# Patient Record
Sex: FEMALE | Race: WHITE | Employment: FULL TIME | URBAN - METROPOLITAN AREA
[De-identification: names, ages, dates, MRNs, and addresses within clinical notes are randomized per-mention and may not be internally consistent; named-entity substitution may affect disease eponyms.]

---

## 2020-02-08 ENCOUNTER — HOSPITAL ENCOUNTER (EMERGENCY)
Facility: CLINIC | Age: 65
Discharge: HOME | End: 2020-02-08
Attending: EMERGENCY MEDICINE | Admitting: EMERGENCY MEDICINE
Payer: COMMERCIAL

## 2020-02-08 ENCOUNTER — APPOINTMENT (OUTPATIENT)
Dept: GENERAL RADIOLOGY | Facility: CLINIC | Age: 65
End: 2020-02-08
Attending: EMERGENCY MEDICINE
Payer: COMMERCIAL

## 2020-02-08 VITALS
TEMPERATURE: 97.9 F | HEART RATE: 89 BPM | BODY MASS INDEX: 45.98 KG/M2 | OXYGEN SATURATION: 98 % | HEIGHT: 65 IN | RESPIRATION RATE: 18 BRPM | DIASTOLIC BLOOD PRESSURE: 72 MMHG | WEIGHT: 276 LBS | SYSTOLIC BLOOD PRESSURE: 146 MMHG

## 2020-02-08 DIAGNOSIS — R07.9 CHEST PAIN, UNSPECIFIED TYPE: ICD-10-CM

## 2020-02-08 DIAGNOSIS — R10.13 EPIGASTRIC PAIN: ICD-10-CM

## 2020-02-08 LAB
ALBUMIN SERPL-MCNC: 3 G/DL (ref 3.4–5)
ALP SERPL-CCNC: 107 U/L (ref 40–150)
ALT SERPL W P-5'-P-CCNC: 47 U/L (ref 0–50)
ANION GAP SERPL CALCULATED.3IONS-SCNC: 6 MMOL/L (ref 3–14)
AST SERPL W P-5'-P-CCNC: 24 U/L (ref 0–45)
BASOPHILS # BLD AUTO: 0 10E9/L (ref 0–0.2)
BASOPHILS NFR BLD AUTO: 0.2 %
BILIRUB DIRECT SERPL-MCNC: <0.1 MG/DL (ref 0–0.2)
BILIRUB SERPL-MCNC: 0.3 MG/DL (ref 0.2–1.3)
BUN SERPL-MCNC: 17 MG/DL (ref 7–30)
CALCIUM SERPL-MCNC: 9.1 MG/DL (ref 8.5–10.1)
CHLORIDE SERPL-SCNC: 104 MMOL/L (ref 94–109)
CO2 SERPL-SCNC: 28 MMOL/L (ref 20–32)
CREAT SERPL-MCNC: 0.6 MG/DL (ref 0.52–1.04)
DIFFERENTIAL METHOD BLD: ABNORMAL
EOSINOPHIL # BLD AUTO: 0.3 10E9/L (ref 0–0.7)
EOSINOPHIL NFR BLD AUTO: 2.7 %
ERYTHROCYTE [DISTWIDTH] IN BLOOD BY AUTOMATED COUNT: 13.1 % (ref 10–15)
GFR SERPL CREATININE-BSD FRML MDRD: >90 ML/MIN/{1.73_M2}
GLUCOSE SERPL-MCNC: 267 MG/DL (ref 70–99)
HCT VFR BLD AUTO: 42.3 % (ref 35–47)
HGB BLD-MCNC: 13.7 G/DL (ref 11.7–15.7)
IMM GRANULOCYTES # BLD: 0.1 10E9/L (ref 0–0.4)
IMM GRANULOCYTES NFR BLD: 0.5 %
INTERPRETATION ECG - MUSE: NORMAL
LIPASE SERPL-CCNC: 109 U/L (ref 73–393)
LYMPHOCYTES # BLD AUTO: 2.2 10E9/L (ref 0.8–5.3)
LYMPHOCYTES NFR BLD AUTO: 17.6 %
MCH RBC QN AUTO: 28.4 PG (ref 26.5–33)
MCHC RBC AUTO-ENTMCNC: 32.4 G/DL (ref 31.5–36.5)
MCV RBC AUTO: 88 FL (ref 78–100)
MONOCYTES # BLD AUTO: 0.8 10E9/L (ref 0–1.3)
MONOCYTES NFR BLD AUTO: 6.2 %
NEUTROPHILS # BLD AUTO: 9 10E9/L (ref 1.6–8.3)
NEUTROPHILS NFR BLD AUTO: 72.8 %
NRBC # BLD AUTO: 0 10*3/UL
NRBC BLD AUTO-RTO: 0 /100
PLATELET # BLD AUTO: 259 10E9/L (ref 150–450)
POTASSIUM SERPL-SCNC: 3.9 MMOL/L (ref 3.4–5.3)
PROT SERPL-MCNC: 6.7 G/DL (ref 6.8–8.8)
RBC # BLD AUTO: 4.82 10E12/L (ref 3.8–5.2)
SODIUM SERPL-SCNC: 138 MMOL/L (ref 133–144)
TROPONIN I BLD-MCNC: 0.02 UG/L (ref 0–0.08)
TROPONIN I SERPL-MCNC: <0.015 UG/L (ref 0–0.04)
WBC # BLD AUTO: 12.3 10E9/L (ref 4–11)

## 2020-02-08 PROCEDURE — 84484 ASSAY OF TROPONIN QUANT: CPT | Performed by: EMERGENCY MEDICINE

## 2020-02-08 PROCEDURE — 84484 ASSAY OF TROPONIN QUANT: CPT

## 2020-02-08 PROCEDURE — 96374 THER/PROPH/DIAG INJ IV PUSH: CPT

## 2020-02-08 PROCEDURE — 93005 ELECTROCARDIOGRAM TRACING: CPT

## 2020-02-08 PROCEDURE — 25000125 ZZHC RX 250: Performed by: EMERGENCY MEDICINE

## 2020-02-08 PROCEDURE — 99285 EMERGENCY DEPT VISIT HI MDM: CPT | Mod: 25

## 2020-02-08 PROCEDURE — 83690 ASSAY OF LIPASE: CPT | Performed by: EMERGENCY MEDICINE

## 2020-02-08 PROCEDURE — 80048 BASIC METABOLIC PNL TOTAL CA: CPT | Performed by: EMERGENCY MEDICINE

## 2020-02-08 PROCEDURE — 80076 HEPATIC FUNCTION PANEL: CPT | Performed by: EMERGENCY MEDICINE

## 2020-02-08 PROCEDURE — 25000132 ZZH RX MED GY IP 250 OP 250 PS 637: Performed by: EMERGENCY MEDICINE

## 2020-02-08 PROCEDURE — 85025 COMPLETE CBC W/AUTO DIFF WBC: CPT | Performed by: EMERGENCY MEDICINE

## 2020-02-08 PROCEDURE — 71046 X-RAY EXAM CHEST 2 VIEWS: CPT

## 2020-02-08 RX ORDER — SUCRALFATE ORAL 1 G/10ML
1 SUSPENSION ORAL ONCE
Status: COMPLETED | OUTPATIENT
Start: 2020-02-08 | End: 2020-02-08

## 2020-02-08 RX ORDER — SUCRALFATE 1 G/1
1 TABLET ORAL 4 TIMES DAILY
Qty: 56 TABLET | Refills: 0 | Status: SHIPPED | OUTPATIENT
Start: 2020-02-08 | End: 2020-02-22

## 2020-02-08 RX ORDER — FAMOTIDINE 40 MG/1
40 TABLET, FILM COATED ORAL DAILY
Qty: 20 TABLET | Refills: 0 | Status: SHIPPED | OUTPATIENT
Start: 2020-02-08 | End: 2024-03-07

## 2020-02-08 RX ADMIN — FAMOTIDINE 20 MG: 10 INJECTION, SOLUTION INTRAVENOUS at 17:40

## 2020-02-08 RX ADMIN — LIDOCAINE HYDROCHLORIDE 30 ML: 20 SOLUTION ORAL; TOPICAL at 16:14

## 2020-02-08 RX ADMIN — SUCRALFATE 1 G: 1 SUSPENSION ORAL at 17:40

## 2020-02-08 ASSESSMENT — ENCOUNTER SYMPTOMS
CHEST TIGHTNESS: 1
MYALGIAS: 0
NAUSEA: 1
BLOOD IN STOOL: 0

## 2020-02-08 ASSESSMENT — MIFFLIN-ST. JEOR: SCORE: 1812.81

## 2020-02-08 NOTE — ED NOTES
"Pt to the room by wheel chair patient asked \"where are you taking me? To the [facility]!\" Patient seems to have a lot of anxiety.  "

## 2020-02-08 NOTE — ED PROVIDER NOTES
History     Chief Complaint:  Chest Pain    HPI   Mindi Osorio is a 64 year old female with a history of hypertension, hyperlipidemia, chronic diarrhea, tobacco abuse, among others who presents for evaluation of intermittent mid sternal chest pain and pressure, associated with indigestion, nausea lightheadedness, and generalized weakness, that began about 2 days ago. The patient states for the last few days she has had mid sternal pain and pressure, which radiates into his back. She also endorses indigestion, nausea, lightheadedness, and generalized weakness. The pain alleviates when laying down and right after eating but is never completely alleviated and typically lasts a few hours. She denies any exacerbating factors, such as exertion or ambulation. Symptoms not subsiding prompted her presentation.    Here, the patient denies any hematochezia, new leg swelling, or myalgias. She does not use ibuprofen excessively. Of note, the patient recently finished a course of an antibiotic about a week ago.     Cardiac/PE/DVT Risk Factors:  History of hypertension - Yes  History of hyperlipidemia - Yes  History of diabetes - No  History of smoking - Yes  Personal history of PE/DVT - No  Family history of PE/DVT - No  Family history of heart complications - Yes  Recent travel - No  Recent surgery - No  Other immobilizations - No  Cancer - No     Allergies:  Sulfa drugs    Cefuroxime     Medications:    Lexapro  Propecia  Prometrium  Aldactone   Prinivil  Wellbutrin   Colestid     Past Medical History:    Hypertension  Hyperlipidemia  PONV   Anxiety  Chronic diarrhea  Obesity  Rheumatoid arthritis   Asthma    Past Surgical History:    Ent surgery, sinus  D&C  Cholecystectomy     Family History:    Father - Cancer  Mother - Heart failure  Brother - Parkinsonism     Social History:  The patient was unaccompanied to the ED.  Smoking Status: Never  Smokeless Tobacco: Current   Alcohol Use: Yes  Drug Use: No   Marital  "Status:   [2]     Review of Systems   Respiratory: Positive for chest tightness.    Cardiovascular: Positive for chest pain. Negative for leg swelling.   Gastrointestinal: Positive for nausea. Negative for blood in stool.   Musculoskeletal: Negative for myalgias.   All other systems reviewed and are negative.        Physical Exam     Patient Vitals for the past 24 hrs:   BP Temp Temp src Pulse Resp SpO2 Height Weight   02/08/20 1545 (!) 142/74 -- -- 100 -- -- -- --   02/08/20 1511 (!) 176/84 97.9  F (36.6  C) Oral 101 16 97 % 1.651 m (5' 5\") 125.2 kg (276 lb)      Physical Exam  General: Alert, interactive in mild distress  Head:  Scalp is atraumatic  Eyes:  The pupils are equal, round, and reactive to light    EOM's intact    No scleral icterus  ENT:      Nose:  The external nose is normal  Ears:  External ears are normal  Mouth/Throat: The oropharynx is normal    Mucus membranes are moist      Neck:  Normal range of motion.      There is no rigidity.    Trachea is in the midline         CV:  Regular rate and rhythm    No murmur, no peripheral edema  Resp:  Breath sounds are clear bilaterally    Non-labored, no retractions or accessory muscle use      GI:  Abdomen is soft, no distension. Mild epigastric tenderness.       MS:  Normal strength in all 4 extremities  Skin:  Warm and dry, No rash or lesions noted.  Neuro: Strength 5/5 x4.  Sensation intact  In all 4 extremities.        Psych:  Awake. Alert.  Normal affect.      Appropriate interactions.    Emergency Department Course     ECG:  ECG taken at 1512, ECG read at 1601 by Dr. Negro Belle MD  Normal sinus rhythm  Nonspecific ST and T wave abnormality  Abnormal ECG   No significant change compared to EKG dated 10/3/11  Rate 99 bpm. NE interval 156. QRS duration 70. QT/QTc 320/410. P-R-T axes 77 31 89.     Imaging:  Radiology findings were communicated with the patient who voiced understanding of the findings.    XR Chest 2 Views   IMPRESSION: Cardiac " silhouette is upper normal limits for size and has  increased in size since the prior study from 2010. Recommend clinical  correlation for possible cardiac cause of the chest pain. No overt  congestive heart failure is seen. No other evidence of acute  cardiopulmonary disease is seen.  Reading per radiology.     Laboratory:  Laboratory findings were communicated with the patient who voiced understanding of the findings.    CBC: WBC 12.3 (H) o/w WNL (HGB 13.7, )   BMP: Glucose 267 (H) o/w WNL (Creatinine 0.60)   Troponin (Collected 1559): <0.015  Troponin POCT: 0.02  Lipase: 109     Hepatic Panel: Albumin 3.0 (L), Protein total 6.7 (L) o/w WNL    Interventions:  1614 GI Cocktail (Maalox/Mylanta and viscous Lidocaine), 30 mL suspension, PO    1740 Pepcid 20 mg IV  1740 Carafate 1 g PO    Emergency Department Course:  Nursing notes and vitals reviewed.  EKG obtained in the ED, see results above.    The patient was sent for a XR Chest 2 Views  while in the emergency department, results above.    IV was inserted and blood was drawn for laboratory testing, results above.     (1601)   I performed an exam of the patient as documented above. History obtained from patient.    (1655)   I rechecked the patient and discussed results and plan of care.     (1818)   I rechecked the patient and discussed results and plan of care.     Findings and plan explained to the Patient. Patient discharged home with instructions regarding supportive care, medications, and reasons to return. The importance of close follow-up was reviewed. The patient was prescribed Pepcid and Carafate. I personally reviewed the laboratory results with the Patient and answered all related questions prior to discharge.      HEART Score  Background  Calculates the overall risk of adverse event in patient's presenting with chest pain.  Based on 5 criteria (each assigned 0-2 points) including suspiciousness of history, EKG, age, risk factors and troponin.     Data  64 year old female  does not have a problem list on file.   reports that she has never smoked. She uses smokeless tobacco.  family history is not on file.  Lab Results   Component Value Date    TROPI <0.015 02/08/2020     Criteria   0-2 points for each of 5 items (maximum of 10 points):  Score 0- History slightly suspicious for coronary syndrome  Score 1- EKG with Non-specific repolarization disturbance  Score 1- Age 45 to 65 years old  Score 1- One to 2 risk factors for atherosclerotic disease  Score 0- Within normal limits for troponin levels  Interpretation  Risk of adverse outcome  Heart Score: 3  Total Score 0-3- Adverse Outcome Risk 2.5% - Supports early discharge with appropriate follow-up        Impression & Plan      Medical Decision Making:  Ms. Osorio was seen and evaluated. The above work-up was undertaken. She does have risk factors for cardiac disease, however she has a non-ischemic EKG and negative troponin x2 and her symptoms complex is somewhat atypical in that it develops from the epigastric region and does not seem to change with exertion. She had improvement of her symptoms with GI cocktail as well as Carafate and I favor a GI etiology, however given the cardiac risk factors I have ordered an outpatient stress echocardiogram as well as cardiology ROXANN evaluation for further risk factor modification. The patient was in agreement with this plan. I doubt pulmonary embolism, pneumonia, pneumothorax. There are no signs of pancreatitis or cholecystitis. The patient was feeling improved, is comfortable with the plan of action, and was subsequently discharged to home. She will return if new symptoms develop.     Diagnosis:    ICD-10-CM    1. Epigastric pain R10.13    2. Chest pain, unspecified type R07.9 Discharge Order: F/U with Cardiac  ROXANN     Exercise Stress Echocardiogram      Disposition:   Discharge to home.      Discharge Medications:     Medication List      Started    famotidine 40 MG  tablet  Commonly known as:  PEPCID  40 mg, Oral, DAILY     sucralfate 1 GM tablet  Commonly known as:  CARAFATE  1 g, Oral, 4 TIMES DAILY          Scribe Disclosure:  I, Luis E Bustillo, am serving as a scribe at 3:21 PM on 2/8/2020 to document services personally performed by Negro Belle MD, based on my observations and the provider's statements to me.  2/8/2020    EMERGENCY DEPARTMENT       Negro Belle MD  02/08/20 2038

## 2020-02-08 NOTE — ED AVS SNAPSHOT
Emergency Department  [address] [facility] [place] [address]  Phone:  [phone]  Fax:  [phone]                                    Mindi Osorio   MRN: [id number]    Department:   Emergency Department   Date of Visit:  2/8/2020           After Visit Summary Signature Page    I have received my discharge instructions, and my questions have been answered. I have discussed any challenges I see with this plan with the nurse or doctor.    ..........................................................................................................................................  Patient/Patient Representative Signature      ..........................................................................................................................................  Patient Representative Print Name and Relationship to Patient    ..................................................               ................................................  Date                                   Time    ..........................................................................................................................................  Reviewed by Signature/Title    ...................................................              ..............................................  Date                                               Time          22EPIC Rev 08/18

## 2020-02-21 ENCOUNTER — HOSPITAL ENCOUNTER (OUTPATIENT)
Dept: CARDIOLOGY | Facility: CLINIC | Age: 65
Discharge: HOME | End: 2020-02-21
Attending: EMERGENCY MEDICINE | Admitting: EMERGENCY MEDICINE
Payer: COMMERCIAL

## 2020-02-21 DIAGNOSIS — R07.9 CHEST PAIN, UNSPECIFIED TYPE: ICD-10-CM

## 2020-02-21 PROCEDURE — 93018 CV STRESS TEST I&R ONLY: CPT | Performed by: INTERNAL MEDICINE

## 2020-02-21 PROCEDURE — 93321 DOPPLER ECHO F-UP/LMTD STD: CPT | Mod: 26 | Performed by: INTERNAL MEDICINE

## 2020-02-21 PROCEDURE — 93321 DOPPLER ECHO F-UP/LMTD STD: CPT | Mod: TC

## 2020-02-21 PROCEDURE — 93350 STRESS TTE ONLY: CPT | Mod: 26 | Performed by: INTERNAL MEDICINE

## 2020-02-21 PROCEDURE — 25500064 ZZH RX 255 OP 636: Performed by: EMERGENCY MEDICINE

## 2020-02-21 PROCEDURE — 93016 CV STRESS TEST SUPVJ ONLY: CPT | Performed by: INTERNAL MEDICINE

## 2020-02-21 PROCEDURE — 93325 DOPPLER ECHO COLOR FLOW MAPG: CPT | Mod: 26 | Performed by: INTERNAL MEDICINE

## 2020-02-21 RX ADMIN — HUMAN ALBUMIN MICROSPHERES AND PERFLUTREN 9 ML: 10; .22 INJECTION, SOLUTION INTRAVENOUS at 15:42

## 2020-03-17 ENCOUNTER — TELEPHONE (OUTPATIENT)
Dept: CARDIOLOGY | Facility: CLINIC | Age: 65
End: 2020-03-17

## 2020-03-17 NOTE — TELEPHONE ENCOUNTER
VM received from patient, stating that she is wondering if she should keep her OV on 3/20/20 with Dr. Robledo. Contacted patient to review further. Patient states that she feels that her concerns are not emergent, and given the situation right now with COVID-19, she will call back to reschedule her appointment at a later time. OV for 3/20/20 cancelled.

## 2021-01-14 ENCOUNTER — HEALTH MAINTENANCE LETTER (OUTPATIENT)
Age: 66
End: 2021-01-14

## 2021-10-24 ENCOUNTER — HEALTH MAINTENANCE LETTER (OUTPATIENT)
Age: 66
End: 2021-10-24

## 2022-02-13 ENCOUNTER — HEALTH MAINTENANCE LETTER (OUTPATIENT)
Age: 67
End: 2022-02-13

## 2022-10-15 ENCOUNTER — HEALTH MAINTENANCE LETTER (OUTPATIENT)
Age: 67
End: 2022-10-15

## 2023-03-26 ENCOUNTER — HEALTH MAINTENANCE LETTER (OUTPATIENT)
Age: 68
End: 2023-03-26

## 2024-01-23 PROCEDURE — 88341 IMHCHEM/IMCYTCHM EA ADD ANTB: CPT | Mod: 26 | Performed by: PATHOLOGY

## 2024-01-23 PROCEDURE — 88342 IMHCHEM/IMCYTCHM 1ST ANTB: CPT | Mod: 26 | Performed by: PATHOLOGY

## 2024-01-23 PROCEDURE — 88360 TUMOR IMMUNOHISTOCHEM/MANUAL: CPT | Mod: TC,ORL | Performed by: COLON & RECTAL SURGERY

## 2024-01-23 PROCEDURE — 88305 TISSUE EXAM BY PATHOLOGIST: CPT | Mod: 26 | Performed by: PATHOLOGY

## 2024-01-24 ENCOUNTER — LAB REQUISITION (OUTPATIENT)
Dept: LAB | Facility: CLINIC | Age: 69
End: 2024-01-24
Payer: COMMERCIAL

## 2024-01-29 LAB
PATH REPORT.COMMENTS IMP SPEC: NORMAL
PATH REPORT.COMMENTS IMP SPEC: NORMAL
PATH REPORT.FINAL DX SPEC: NORMAL
PATH REPORT.GROSS SPEC: NORMAL
PATH REPORT.MICROSCOPIC SPEC OTHER STN: NORMAL
PATH REPORT.RELEVANT HX SPEC: NORMAL
PHOTO IMAGE: NORMAL

## 2024-03-01 NOTE — H&P (VIEW-ONLY)
[facility]      Preoperative Consultation   Mindi Osorio   : 1955   Gender: female    Date of Encounter: 3/1/2024    Nursing Notes:   Rafael Flynn  3/1/2024  4:30 PM  Signed  Mindi Osorio is a 68 y.o. female (1955) who presents for preop evaluation undergoing High resolution anoscopy.    Date of Surgery: 3/11/24  Surgical Specialty: Colorectal  Elba Albert MD - [facility]  Hospital/Surgical Facility:  [facility]  Fax number: NA  Surgery type: outpatient  Primary Physician: Mariya Linder ....................  3/1/2024   4:25 PM       History of Present Illness   See above     Review of Systems   A comprehensive review of systems was negative except for items noted in HPI.    Patient Active Problem List   Diagnosis Code     Rheumatoid arthritis involving multiple sites with positive rheumatoid factor (HC) M05.79     Mild intermittent asthma without complication J45.20     Morbid obesity due to excess calories (HC) E66.01     Chronic diarrhea K52.9     Adenomatous polyp of colon D12.6     Essential hypertension I10     Anxiety F41.9     DISH (diffuse idiopathic skeletal hyperostosis) M48.10     Insufficient sleep syndrome F51.12     Lumbar disc herniation M51.26     TO (obstructive sleep apnea) G47.33     Primary osteoarthritis involving multiple joints M15.9     Type 2 diabetes mellitus without complication, without long-term current use of insulin (HC) E11.9     Current Outpatient Medications   Medication Sig     amLODIPine (NORVASC) 5 mg tablet TAKE 1 TABLET(5 MG) BY MOUTH DAILY     atorvastatin (LIPITOR) 40 mg tablet Take 1 Tablet by mouth once daily.     blood sugar diagnostic strip 1 Each.     escitalopram oxalate (LEXAPRO) 20 mg tablet TAKE 1 TABLET BY MOUTH  EVERY MORNING     lancets three times daily.     leflunomide (ARAVA) 10 mg tablet Take 1 tablet by mouth once daily.     losartan (COZAAR) 100 mg tablet  "Take 1 Tablet by mouth once daily.     metFORMIN (GLUCOPHAGE XR) 500 mg Extended-Release tablet TAKE 4 TABLETS DAILY WITH  BREAKFAST     minoxidiL (LONITEN) 2.5 mg tab Take 2.5 mg by mouth once daily.     [START ON 4/2/2024] pen tirzepatide (Mounjaro) 10 mg/0.5 mL pen Inject 10 mg subcutaneous.     semaglutide (OZEMPIC) 2 mg/dose (8 mg/3 mL) pen Inject 1 mg subcutaneous.     sensor (FreeStyle Magdalene 3 Sensor) for continuous blood glucose monitor (CGM) Use 1 sensor every 14 days     [START ON 4/30/2024] tirzepatide (Mounjaro) 12.5 mg/0.5 mL pen Inject 12.5 mg subcutaneous.     [START ON 5/28/2024] tirzepatide (Mounjaro) 15 mg/0.5 mL pen Inject 15 mg subcutaneous.     [START ON 3/5/2024] tirzepatide (Mounjaro) 7.5 mg/0.5 mL pen Inject 7.5 mg subcutaneous.     triamterene-hydrochlorothiazide, 37.5-25 mg, (DYAZIDE) 37.5-25 mg capsule Take 1 Capsule by mouth once daily.     No current facility-administered medications for this visit.     Medications have been reviewed by me and are current to the best of my knowledge and ability.     Allergies   Allergen Reactions     Cefuroxime Diarrhea     Flu like symptoms     Gold Au 198 Rash     Sulfa (Sulfonamide Antibiotics) Fever     Past Surgical History:   . Laterality Date     GALLBLADDER SURGERY       FL UNLISTED PROCEDURE ACCESSORY SINUSES       Social History     Tobacco Use     Smoking status: Never     Smokeless tobacco: Never   Vaping Use     Vaping Use: Never used   Substance Use Topics     Alcohol use: No     Alcohol/week: 0.0 standard drinks of alcohol     Drug use: No     Family History   Problem Relation Age of Onset     Parkinsonism Brother      Heart failure Mother 76     Cancer Father 77        gall bladder     PAST DIFFICULTY WITH ANESTHESIA: None     Physical Exam   /80 (Cuff Site: Left Arm, Position: Sitting, Cuff Size: Adult Large)   Pulse 79   Ht 1.626 m (5' 4\")   Wt 112.1 kg (247 lb 3.2 oz)   SpO2 96%   BMI 42.43 kg/m   Body mass index is 42.43 " kg/m .  Gen: Well developed, well nourished, no acute distress, alert and oriented x 3  HEENT:     Head: MMM     Ears: b/l tympanic membranes intact and reactive to light     Eyes: EOMI, PERRLA, sclera clear, no conjunctival injection     Neck: supple, no JVD, no thyromegaly     Throat: no erythema/exudate in posterior OP  CV: RRR, no MRG  Resp: Lungs CTAB  Abd: soft, nontender, bowel sounds x 4, no HSM, masses  Psych: Behavior appropriate, engaging. Thought processes congruent, non tangential.  Neuro: CN II-XII intact grossly, no focal deficits observed  MSK: No gross deformities  LE: no edema  Skin: no visible lesions or rashes     Assessment / Plan     The Pre-Op Tool    Recommendations      Low Risk Procedure    Cardiac History  No history of coronary artery disease           Labs  Potassium within last 30 days  EKG  Not indicated  Stress Testing  Not indicated    * Testing recommendations are intended to assist, but not direct, clinical decisions.           Do not take metformin the evening before the procedure or the morning of the procedure.  Do not take once weekly non-insulin injectable diabetes/weight loss medication for 7 days prior to the procedure.  Hold Losartan (Cozaar) the evening before and/or morning of the procedure.  Hold Hydrochlorothiazide on the morning of procedure.    * Medication recommendations are not intended to be exhaustive; they are limited to common medications that are potentially dangerous if incorrectly managed          Labs  * Data supports elimination of  routine  laboratory testing in favor of focused,  indicated  testing based on medical co-morbidities. A 2009 study randomized 1061 patients undergoing ambulatory, non-cataract surgery to routine or to indicated testing. Perioperative adverse events were similar (Anesthesia & Analgesia 2009;108:467-75; Anesthesiol. Clin. 2016 Mar;34(1):43-58).  EKG  * The ACC/AHA recommends against obtaining routine EKGs in patients undergoing  low risk surgeries, a class IIa recommendation (JACC. 2014;64(21);e1-76).  RAAS Antagonist  * Hypotension during anesthesia is associated with continuing renin-angiotensin system (RAAS) antagonist. While it is unclear if holding RAAS antagonists reduces postoperative complications, in most circumstances our experts recommend holding RAAS antagonist 24 hours prior to surgery. (Postgrad Med J 2011;87:472-81; Anest 2017;126:16-27; BMC Anesthesiol 2018;18:26.)     Session ID: [id number]  Endnotes and bibliography available upon request: [email or web address]  Labs: pending  ECG: not indicated, last one done 1/19/24  Mindi was seen today for preoperative exam.    Diagnoses and all orders for this visit:    Preop examination, Anal intraepithelial neoplasia II, Chronic diarrhea  -     BASIC METABOLIC PANEL; Future    Essential hypertension    Type 2 diabetes mellitus without complication, without long-term current use of insulin (HC): A1c 6.3 1/9/24    Patient is cleared for planned procedure pending lab results.   Electronically Signed by:   Mariya Linder DO, CAQSM  3/1/2024

## 2024-03-07 RX ORDER — LOSARTAN POTASSIUM 100 MG/1
1 TABLET ORAL DAILY
COMMUNITY
Start: 2023-02-12

## 2024-03-07 RX ORDER — MINOXIDIL 2.5 MG/1
2.5 TABLET ORAL
COMMUNITY
Start: 2023-06-06

## 2024-03-07 RX ORDER — TIRZEPATIDE 15 MG/.5ML
15 INJECTION, SOLUTION SUBCUTANEOUS
COMMUNITY
Start: 2024-05-28

## 2024-03-07 RX ORDER — OMEGA-3-ACID ETHYL ESTERS 1 G/1
CAPSULE, LIQUID FILLED ORAL
COMMUNITY
Start: 2023-02-18

## 2024-03-07 RX ORDER — TIRZEPATIDE 10 MG/.5ML
10 INJECTION, SOLUTION SUBCUTANEOUS
COMMUNITY
Start: 2024-04-02 | End: 2024-04-30

## 2024-03-07 RX ORDER — LEFLUNOMIDE 10 MG/1
1 TABLET ORAL DAILY
COMMUNITY
Start: 2023-06-30

## 2024-03-07 RX ORDER — LORAZEPAM 1 MG/1
1 TABLET ORAL EVERY 6 HOURS PRN
COMMUNITY
Start: 2022-06-11

## 2024-03-07 RX ORDER — TIRZEPATIDE 7.5 MG/.5ML
7.5 INJECTION, SOLUTION SUBCUTANEOUS
COMMUNITY
Start: 2024-03-05 | End: 2024-04-02

## 2024-03-07 RX ORDER — TRIAMTERENE AND HYDROCHLOROTHIAZIDE 37.5; 25 MG/1; MG/1
1 CAPSULE ORAL DAILY
COMMUNITY
Start: 2023-07-04 | End: 2024-07-03

## 2024-03-07 RX ORDER — AMLODIPINE BESYLATE 5 MG/1
5 TABLET ORAL DAILY
COMMUNITY
Start: 2023-04-05

## 2024-03-07 RX ORDER — TRIAMCINOLONE ACETONIDE 1 MG/G
CREAM TOPICAL
COMMUNITY
Start: 2023-11-01

## 2024-03-07 RX ORDER — ATORVASTATIN CALCIUM 40 MG/1
1 TABLET, FILM COATED ORAL DAILY
COMMUNITY
Start: 2023-02-23

## 2024-03-07 RX ORDER — METFORMIN HCL 500 MG
500 TABLET, EXTENDED RELEASE 24 HR ORAL
COMMUNITY
Start: 2023-02-23

## 2024-03-07 RX ORDER — TIRZEPATIDE 12.5 MG/.5ML
12.5 INJECTION, SOLUTION SUBCUTANEOUS
COMMUNITY
Start: 2024-04-30 | End: 2024-05-28

## 2024-03-08 ENCOUNTER — ANESTHESIA EVENT (OUTPATIENT)
Dept: SURGERY | Facility: AMBULATORY SURGERY CENTER | Age: 69
End: 2024-03-08
Payer: COMMERCIAL

## 2024-03-11 ENCOUNTER — HOSPITAL ENCOUNTER (OUTPATIENT)
Facility: AMBULATORY SURGERY CENTER | Age: 69
Discharge: HOME | End: 2024-03-11
Attending: COLON & RECTAL SURGERY
Payer: COMMERCIAL

## 2024-03-11 ENCOUNTER — ANESTHESIA (OUTPATIENT)
Dept: SURGERY | Facility: AMBULATORY SURGERY CENTER | Age: 69
End: 2024-03-11
Payer: COMMERCIAL

## 2024-03-11 VITALS
TEMPERATURE: 97.4 F | DIASTOLIC BLOOD PRESSURE: 59 MMHG | BODY MASS INDEX: 42.17 KG/M2 | WEIGHT: 247 LBS | HEIGHT: 64 IN | OXYGEN SATURATION: 96 % | SYSTOLIC BLOOD PRESSURE: 112 MMHG | RESPIRATION RATE: 16 BRPM

## 2024-03-11 DIAGNOSIS — K62.82: ICD-10-CM

## 2024-03-11 LAB — GLUCOSE BY METER: 147

## 2024-03-11 RX ORDER — ONDANSETRON 2 MG/ML
4 INJECTION INTRAMUSCULAR; INTRAVENOUS EVERY 30 MIN PRN
Status: DISCONTINUED | OUTPATIENT
Start: 2024-03-11 | End: 2024-03-12 | Stop reason: HOSPADM

## 2024-03-11 RX ORDER — ONDANSETRON 2 MG/ML
INJECTION INTRAMUSCULAR; INTRAVENOUS PRN
Status: DISCONTINUED | OUTPATIENT
Start: 2024-03-11 | End: 2024-03-11

## 2024-03-11 RX ORDER — MEPERIDINE HYDROCHLORIDE 25 MG/ML
12.5 INJECTION INTRAMUSCULAR; INTRAVENOUS; SUBCUTANEOUS EVERY 5 MIN PRN
Status: DISCONTINUED | OUTPATIENT
Start: 2024-03-11 | End: 2024-03-12 | Stop reason: HOSPADM

## 2024-03-11 RX ORDER — NALOXONE HYDROCHLORIDE 0.4 MG/ML
0.1 INJECTION, SOLUTION INTRAMUSCULAR; INTRAVENOUS; SUBCUTANEOUS
Status: DISCONTINUED | OUTPATIENT
Start: 2024-03-11 | End: 2024-03-12 | Stop reason: HOSPADM

## 2024-03-11 RX ORDER — LIDOCAINE HYDROCHLORIDE 20 MG/ML
INJECTION, SOLUTION INFILTRATION; PERINEURAL PRN
Status: DISCONTINUED | OUTPATIENT
Start: 2024-03-11 | End: 2024-03-11

## 2024-03-11 RX ORDER — SODIUM CHLORIDE, SODIUM LACTATE, POTASSIUM CHLORIDE, CALCIUM CHLORIDE 600; 310; 30; 20 MG/100ML; MG/100ML; MG/100ML; MG/100ML
INJECTION, SOLUTION INTRAVENOUS CONTINUOUS
Status: DISCONTINUED | OUTPATIENT
Start: 2024-03-11 | End: 2024-03-12 | Stop reason: HOSPADM

## 2024-03-11 RX ORDER — FENTANYL CITRATE 0.05 MG/ML
25 INJECTION, SOLUTION INTRAMUSCULAR; INTRAVENOUS
Status: DISCONTINUED | OUTPATIENT
Start: 2024-03-11 | End: 2024-03-12 | Stop reason: HOSPADM

## 2024-03-11 RX ORDER — OXYCODONE HYDROCHLORIDE 5 MG/1
5 TABLET ORAL
Status: DISCONTINUED | OUTPATIENT
Start: 2024-03-11 | End: 2024-03-12 | Stop reason: HOSPADM

## 2024-03-11 RX ORDER — HYDROMORPHONE HCL IN WATER/PF 6 MG/30 ML
0.4 PATIENT CONTROLLED ANALGESIA SYRINGE INTRAVENOUS EVERY 5 MIN PRN
Status: DISCONTINUED | OUTPATIENT
Start: 2024-03-11 | End: 2024-03-12 | Stop reason: HOSPADM

## 2024-03-11 RX ORDER — FENTANYL CITRATE 0.05 MG/ML
50 INJECTION, SOLUTION INTRAMUSCULAR; INTRAVENOUS EVERY 5 MIN PRN
Status: DISCONTINUED | OUTPATIENT
Start: 2024-03-11 | End: 2024-03-12 | Stop reason: HOSPADM

## 2024-03-11 RX ORDER — OXYCODONE HYDROCHLORIDE 10 MG/1
10 TABLET ORAL
Status: DISCONTINUED | OUTPATIENT
Start: 2024-03-11 | End: 2024-03-12 | Stop reason: HOSPADM

## 2024-03-11 RX ORDER — ONDANSETRON 4 MG/1
4 TABLET, ORALLY DISINTEGRATING ORAL
Status: DISCONTINUED | OUTPATIENT
Start: 2024-03-11 | End: 2024-03-12 | Stop reason: HOSPADM

## 2024-03-11 RX ORDER — HYDROMORPHONE HCL IN WATER/PF 6 MG/30 ML
0.2 PATIENT CONTROLLED ANALGESIA SYRINGE INTRAVENOUS EVERY 5 MIN PRN
Status: DISCONTINUED | OUTPATIENT
Start: 2024-03-11 | End: 2024-03-12 | Stop reason: HOSPADM

## 2024-03-11 RX ORDER — BUPIVACAINE HYDROCHLORIDE AND EPINEPHRINE 2.5; 5 MG/ML; UG/ML
INJECTION, SOLUTION INFILTRATION; PERINEURAL PRN
Status: DISCONTINUED | OUTPATIENT
Start: 2024-03-11 | End: 2024-03-11 | Stop reason: HOSPADM

## 2024-03-11 RX ORDER — FENTANYL CITRATE 0.05 MG/ML
25 INJECTION, SOLUTION INTRAMUSCULAR; INTRAVENOUS EVERY 5 MIN PRN
Status: DISCONTINUED | OUTPATIENT
Start: 2024-03-11 | End: 2024-03-12 | Stop reason: HOSPADM

## 2024-03-11 RX ORDER — ONDANSETRON 4 MG/1
4 TABLET, ORALLY DISINTEGRATING ORAL EVERY 30 MIN PRN
Status: DISCONTINUED | OUTPATIENT
Start: 2024-03-11 | End: 2024-03-12 | Stop reason: HOSPADM

## 2024-03-11 RX ORDER — ACETAMINOPHEN 325 MG/1
650 TABLET ORAL
Status: DISCONTINUED | OUTPATIENT
Start: 2024-03-11 | End: 2024-03-12 | Stop reason: HOSPADM

## 2024-03-11 RX ORDER — PROPOFOL 10 MG/ML
INJECTION, EMULSION INTRAVENOUS CONTINUOUS PRN
Status: DISCONTINUED | OUTPATIENT
Start: 2024-03-11 | End: 2024-03-11

## 2024-03-11 RX ORDER — LIDOCAINE 40 MG/G
CREAM TOPICAL
Status: DISCONTINUED | OUTPATIENT
Start: 2024-03-11 | End: 2024-03-12 | Stop reason: HOSPADM

## 2024-03-11 RX ADMIN — PROPOFOL 200 MCG/KG/MIN: 10 INJECTION, EMULSION INTRAVENOUS at 08:08

## 2024-03-11 RX ADMIN — ONDANSETRON 4 MG: 2 INJECTION INTRAMUSCULAR; INTRAVENOUS at 08:14

## 2024-03-11 RX ADMIN — SODIUM CHLORIDE, SODIUM LACTATE, POTASSIUM CHLORIDE, CALCIUM CHLORIDE: 600; 310; 30; 20 INJECTION, SOLUTION INTRAVENOUS at 07:53

## 2024-03-11 RX ADMIN — LIDOCAINE HYDROCHLORIDE 2 ML: 20 INJECTION, SOLUTION INFILTRATION; PERINEURAL at 08:08

## 2024-03-11 RX ADMIN — Medication 100 MCG: at 08:16

## 2024-03-11 NOTE — ANESTHESIA CARE TRANSFER NOTE
Patient: Mindi Osorio    Procedure: Procedure(s):  EXAM UNDER ANESTHESIA WITH HIGH RESOLUTION ANOSCOPY WITH BIOPSY       Diagnosis: Anal intraepithelial neoplasia II [K62.82]  Diagnosis Additional Information: No value filed.    Anesthesia Type:   MAC     Note:    Oropharynx: oropharynx clear of all foreign objects and spontaneously breathing  Level of Consciousness: awake  Oxygen Supplementation: room air    Independent Airway: airway patency satisfactory and stable  Dentition: dentition unchanged  Vital Signs Stable: post-procedure vital signs reviewed and stable  Report to RN Given: handoff report given  Patient transferred to: Phase II    Handoff Report: Identifed the Patient, Identified the Reponsible Provider, Reviewed the pertinent medical history, Discussed the surgical course, Reviewed Intra-OP anesthesia mangement and issues during anesthesia, Set expectations for post-procedure period and Allowed opportunity for questions and acknowledgement of understanding      Vitals:  Vitals Value Taken Time   /53 03/11/24 0831   Temp 97.4  F (36.3  C) 03/11/24 0831   Pulse 84 03/11/24 0831   Resp 14 03/11/24 0831   SpO2 97 % 03/11/24 0831       Electronically Signed By: SANAM Wen CRNA  March 11, 2024  8:34 AM

## 2024-03-11 NOTE — ANESTHESIA POSTPROCEDURE EVALUATION
Patient: Mindi Osorio    Procedure: Procedure(s):  EXAM UNDER ANESTHESIA WITH HIGH RESOLUTION ANOSCOPY WITH BIOPSY       Anesthesia Type:  MAC    Note:  Disposition: Outpatient   Postop Pain Control: Uneventful            Sign Out: Well controlled pain   PONV: No   Neuro/Psych: Uneventful            Sign Out: Acceptable/Baseline neuro status   Airway/Respiratory: Uneventful            Sign Out: AIRWAY IN SITU/Resp. Support   CV/Hemodynamics: Uneventful            Sign Out: Acceptable CV status; No obvious hypovolemia; No obvious fluid overload   Other NRE: NONE   DID A NON-ROUTINE EVENT OCCUR? No           Last vitals:  Vitals Value Taken Time   /59 03/11/24 0847   Temp 97.4  F (36.3  C) 03/11/24 0831   Pulse 79 03/11/24 0854   Resp 16 03/11/24 0845   SpO2 96 % 03/11/24 0854   Vitals shown include unfiled device data.    Electronically Signed By: Modesto Argueta MD  March 11, 2024  9:13 AM

## 2024-03-11 NOTE — DISCHARGE INSTRUCTIONS
Patient Discharge Instructions:    If you have any questions or concerns regarding your procedure, please contact JOCELYN Shaw, her office number is [phone].    You have just undergone anorectal surgery.  Here are a few things to expect after your surgery:    1) It is common to have pain after surgery.  We try to focus on non-narcotic medications when possible, although sometimes we do give a narcotic prescription for pain that cannot be controlled with Tylenol and Ibuprofen. Take the medications as follows:    Tylenol: 650mg every 4 hours for 48 hours. Then every 4 hour as needed for pain. Do not take the Tylenol if you have been otherwise directed by a doctor not to take it (for liver problems, etc).  Ibuprofen: 800mg every 8 hours for 48 hours WITH FOOD. Then every 8 hours as needed for pain. Don't take NSAIDs if you have Crohn's, Ulcerative Colitis, kidney problems, or have been otherwise instructed not to take them.     You should offset these medications (e.g. Tylenol at 12:00PM, Ibuprofen at 2pm, Tylenol at 4:00PM, Tylenol at 8:00PM, Ibuprofen at 10:00PM).  If you have Oxycodone, you can take it at any time as long as you space it out every 4 hours.     2) You may have some spotting or mild amounts of bleeding/bloody drainage.  If you see more significant bleeding, try holding some pressure over the wound for 15-20 minutes.   If you pass more than a cup full of blood or you cannot get the bleeding to stop by holding pressure, call the office.    3) Infections in this area are rare, but can be serious.  If you see small amounts of yellowish/pus like drainage in the days following surgery, this is expected. However, if you have increasing pain, increasing drainage, fevers, or an inability to urinate (can't pee), call the office or go the ER.     4) You will feel numb in the anorectal area for 4-6 hours after surgery due to the numbing medication used in the operating room.  It is possible you may  experience some fecal incontinence (accidental passage of gas/stool) during this time.  The numbness will resolve on its own.  Once you feel sensation returning, you should consider taking something for pain as you can expect oral medications to take 30-60 minutes before you start feeling their effects.    5) There is generally no specific wound care necessary immediately after surgery (unless otherwise instructed by your surgeon).  Simply showering/bathing 1-2 times a day and after bowel movements is sufficient to keep the wounds clean.  You may want to keep a dry gauze/pad over the wound site as it is normal to have some amount of drainage, and this drainage can be irritating to the skin.  If you have a packing in the wound, your surgeon will give you more specific instructions on how to manage this.    6) For some operations you may have stitches in your wound.  Those stitches almost always break within a few days of surgery.  If you feel a pop or the wound opens - this is OK.  The wound will continue to fill in on its own.  It is still ok to shower/bathe as normal.  Expect some amount of drainage if the wound is open.    7) Avoiding constipation after surgery is very important. Start by taking Psyllium Husk (e.g. Metamucil or Konsyl) 1-2 tablespoons in a large glass of water daily. In addition to this, it is very important to drink at least 64 ounces of water daily. If you get constipated, it will be much more uncomfortable when you do have a bowel movement. If you do not have a BM in 2 days, try one of the following medications (over the counter) listed below:   Milk of Magnesia 30 mL every 8 hours until BM  Miralax 1-2 capfuls daily until BM (this may take 1-2 days)  Senna 1-2 tabs twice a day as necessary    8) Your only activity restrictions are no driving while you are taking narcotics.  You may want to avoid heavy lifting/strenuous exercise for the first 1-2 days after surgery, but if you feel up to  resuming normal activities/exercise, this is ok.    9) Lastly, if you have any questions or concerns - PLEASE CALL ([phone])!  Our office has someone on call 24 hours a day.  If your question is one that can wait until normal business hours (Mon-Fri 8:30AM-5PM), it is better to wait until the daytime as someone more familiar with your care will be able to help you.  However, if it is an emergency, the on-call surgeon will be able to give you good advice.    Adult Discharge Orders & Instructions     For 24 hours after surgery    Get plenty of rest.  A responsible adult must stay with you for at least 24 hours after you leave the hospital.     Do not drive or use heavy equipment.  If you have weakness or tingling, don't drive or use heavy equipment until this feeling goes away.    Do not drink alcohol.    Avoid strenuous or risky activities.  Ask for help when climbing stairs.     You may feel lightheaded.  If so, sit for a few minutes before standing.  Have someone help you get up.     You may have a slight fever. Call the doctor if your fever is over 101.5  F (38.6  C) (taken under the tongue) or lasts longer than 24 hours.    You may have a dry mouth, a sore throat, muscle aches or trouble sleeping.  These should go away after 24 hours.    Do not make important or legal decisions.    Based on the surgery/procedure that you had today, we do not expect that you will have any problems.  However, we want you to know what to do if you have pain, nausea, bleeding, or infection:    To control pain:  Take medicines your physician has prescribed or or over-the counter medicine he or she advises.  Ice packs and periods of rest are often helpful.  For surgery on an arm or leg, raise it on a pillow to ease swelling.  If your pain is not managed with the above methods, contact your physician.        To control nausea:  Take anti-nausea medicine approved by your physician.  Drink clear liquids such as apple juice, ginger  elijah, broth or 7-Up. Be sure to drink enough fluids.  Move to a regular diet as you feel able.  Rest may also help.    Bleeding:  You may see a little blood on your dressing, about the size of a quarter in the first 24 hours.  If you see this, there is no reason to be alarmed.  However, if this continues to increase in size, apply pressure if able, and notify your physician.        Infection: Please contact your physician if you have any of the following signs:  redness, swelling, heat, increasing pain or foul-smelling drainage at your surgery site, fever or chills.    Call your doctor for any of the followin.  It has been over 8 to 10 hours since surgery and you are still not able to urinate (pass water).    2.  Headache for over 24 hours.    3.  Numbness, tingling or weakness in your legs the day after surgery (if you had spinal anesthesia).

## 2024-03-11 NOTE — ANESTHESIA PREPROCEDURE EVALUATION
Anesthesia Pre-Procedure Evaluation    Patient: Mindi Osorio   MRN: [id number] : 1955        Procedure : Procedure(s):  EXAM UNDER ANESTHESIA WITH HIGH RESOLUTION ANOSCOPY          Past Medical History:   Diagnosis Date    Adenomatous polyp of colon     Anxiety     Arthritis     Chronic diarrhea     Difficulty walking     DISH (diffuse idiopathic skeletal hyperostosis)     Hypertension     Insufficient sleep syndrome     Liver disease     Lumbar disc herniation     Morbid obesity due to excess calories (H)     Motion sickness     Obese     Other chronic pain     PONV (postoperative nausea and vomiting)     Sleep apnea     Type 2 diabetes mellitus without complication, without long-term current use of insulin (H)       Past Surgical History:   Procedure Laterality Date    ENT SURGERY      SINUS    GYN SURGERY      D&C/    LAPAROSCOPIC CHOLECYSTECTOMY  2011    Procedure:LAPAROSCOPIC CHOLECYSTECTOMY; LAPAROSCOPIC CHOLECYSTECTOMY; Surgeon:STEPHANIE KWAN; Location:[facility]      Allergies   Allergen Reactions    Cefuroxime Other (See Comments) and Diarrhea     Flu Sx    Sulfa Antibiotics      fever    Gold Au 198 [Gold] Rash      Social History     Tobacco Use    Smoking status: Never    Smokeless tobacco: Never   Substance Use Topics    Alcohol use: Yes     Comment: rarely      Wt Readings from Last 1 Encounters:   24 112 kg (247 lb)        Anesthesia Evaluation   Pt has had prior anesthetic.     History of anesthetic complications  - PONV.      ROS/MED HX  ENT/Pulmonary:  - neg pulmonary ROS   (+) sleep apnea, uses CPAP,                                      Neurologic:  - neg neurologic ROS     Cardiovascular:  - neg cardiovascular ROS   (+)  hypertension- -   -  - -                                      METS/Exercise Tolerance: >4 METS    Hematologic:  - neg hematologic  ROS     Musculoskeletal:  - neg musculoskeletal ROS (+)  arthritis,             GI/Hepatic:  - neg GI/hepatic ROS    "  Renal/Genitourinary:  - neg Renal ROS     Endo:  - neg endo ROS   (+)  type II DM (last mounjaro over 1 week ago), Last HgA1c: 6.3,  Not using insulin,          Obesity (bmi 42),       Psychiatric/Substance Use:  - neg psychiatric ROS     Infectious Disease:  - neg infectious disease ROS     Malignancy:  - neg malignancy ROS     Other:  - neg other ROS    (+)  , H/O Chronic Pain,         Physical Exam    Airway        Mallampati: II   TM distance: > 3 FB   Neck ROM: full   Mouth opening: > 3 cm    Respiratory Devices and Support         Dental       (+) Multiple crowns, permanant bridges      Cardiovascular   cardiovascular exam normal          Pulmonary   pulmonary exam normal                OUTSIDE LABS:  CBC:   Lab Results   Component Value Date    WBC 12.3 (H) 02/08/2020    WBC 14.8 (H) 10/03/2011    HGB 13.7 02/08/2020    HGB 13.8 10/03/2011    HCT 42.3 02/08/2020    HCT 40.8 10/03/2011     02/08/2020     10/03/2011     BMP:   Lab Results   Component Value Date     02/08/2020     10/03/2011    POTASSIUM 3.9 02/08/2020    POTASSIUM 4.5 10/03/2011    CHLORIDE 104 02/08/2020    CHLORIDE 100 10/03/2011    CO2 28 02/08/2020    CO2 28 10/03/2011    BUN 17 02/08/2020    BUN 19 10/03/2011    CR 0.60 02/08/2020    CR 0.76 10/03/2011     (H) 02/08/2020     (H) 10/03/2011     COAGS: No results found for: \"PTT\", \"INR\", \"FIBR\"  POC: No results found for: \"BGM\", \"HCG\", \"HCGS\"  HEPATIC:   Lab Results   Component Value Date    ALBUMIN 3.0 (L) 02/08/2020    PROTTOTAL 6.7 (L) 02/08/2020    ALT 47 02/08/2020    AST 24 02/08/2020    ALKPHOS 107 02/08/2020    BILITOTAL 0.3 02/08/2020     OTHER:   Lab Results   Component Value Date    OK 9.1 02/08/2020    MAG 2.0 10/06/2010    LIPASE 109 02/08/2020    TSH 1.55 10/07/2010       Anesthesia Plan    ASA Status:  3    NPO Status:  NPO Appropriate    Anesthesia Type: MAC.     - Reason for MAC: immobility needed, straight local not clinically " "adequate   Induction: Propofol.   Maintenance: TIVA.        Consents    Anesthesia Plan(s) and associated risks, benefits, and realistic alternatives discussed. Questions answered and patient/representative(s) expressed understanding.     - Discussed:     - Discussed with:  Patient            Postoperative Care    Pain management: Multi-modal analgesia.   PONV prophylaxis: Ondansetron (or other 5HT-3), Dexamethasone or Solumedrol     Comments:    Other Comments:     Reviewed anesthetic options and risks. Patient agrees to proceed.            Modesto Argueta MD    I have reviewed the pertinent notes and labs in the chart from the past 30 days and (re)examined the patient.  Any updates or changes from those notes are reflected in this note.              # Severe Obesity: Estimated body mass index is 42.4 kg/m  as calculated from the following:    Height as of this encounter: 1.626 m (5' 4\").    Weight as of this encounter: 112 kg (247 lb).      "

## 2024-03-11 NOTE — INTERVAL H&P NOTE
"I have reviewed the surgical (or preoperative) H&P that is linked to this encounter, and examined the patient. There are no significant changes    Clinical Conditions Present on Arrival:  Clinically Significant Risk Factors Present on Admission                  # Severe Obesity: Estimated body mass index is 42.4 kg/m  as calculated from the following:    Height as of this encounter: 1.626 m (5' 4\").    Weight as of this encounter: 112 kg (247 lb).       "

## 2024-03-11 NOTE — OP NOTE
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 HRA was recommended.  The  risks and benefits of high resolution anoscopy were discussed with the patient including infection, recurrence, need for further procedures, pain, bleeding, and anesthetic risks. Alternatives were also discussed. The patient agreed to proceed with  surgery and informed consent was obtained.         DESCRIPTION OF PROCEDURE:  The patient was taken to the operating room and placed under MAC anesthesia. She was then placed in the left lateral decubitus position on the operating room table. The buttocks were taped apart. A timeout was performed per protocol. A digital rectal exam was performed which revealed no abnormalities. The plastic anoscope was inserted and an acetic acid soaked gauze was placed in the anal canal and left to dwell for several minutes.      The gauze was then removed and I began with visual inspection of the skin. I could see some hypopigmentation from previous scarring but no external lesions. The plastic anoscope was then reinserted and I used the high resolution anoscope to systematically examined the anal canal, focusing on the squamocolumnar junction. Acetowhitening was identified. I examined the entire canal using 3.25x, 7.5x, and 15x power, with additional acetic acid soaked swabs to help identify abnormal areas. I found an area of lacy metasplasia in the left anterior anal canal. The area was numbed with marcaine and then biopsies were  obtained with a baby Tischler forceps. I used cautery to then achieve hemostasis and ablate the abnormal area. I reinspected the anal canal and found no other areas of concern. THe squamocolumnar junction overall looked very bland. There was no evidence of intraanal condyloma.. Hemostasis was achieved. We then removed the anoscope. Fluffs were applied. All sponge, needle and instrument counts were correct at the end of the procedure. The patient tolerated the procedure well and was awakened from anesthesia without difficulty, and transferred to the recovery room in stable condition.     Elba Albert MD, MS, FACS, FASCRS  [facility]  Office: [phone]  3/11/2024 8:38 AM

## 2024-08-04 ENCOUNTER — HEALTH MAINTENANCE LETTER (OUTPATIENT)
Age: 69
End: 2024-08-04

## 2025-04-12 ENCOUNTER — HEALTH MAINTENANCE LETTER (OUTPATIENT)
Age: 70
End: 2025-04-12

## 2025-08-16 ENCOUNTER — HEALTH MAINTENANCE LETTER (OUTPATIENT)
Age: 70
End: 2025-08-16